# Patient Record
Sex: MALE | Race: AMERICAN INDIAN OR ALASKA NATIVE
[De-identification: names, ages, dates, MRNs, and addresses within clinical notes are randomized per-mention and may not be internally consistent; named-entity substitution may affect disease eponyms.]

---

## 2018-09-04 ENCOUNTER — HOSPITAL ENCOUNTER (EMERGENCY)
Dept: HOSPITAL 5 - ED | Age: 48
Discharge: HOME | End: 2018-09-04
Payer: SELF-PAY

## 2018-09-04 VITALS — SYSTOLIC BLOOD PRESSURE: 141 MMHG | DIASTOLIC BLOOD PRESSURE: 80 MMHG

## 2018-09-04 DIAGNOSIS — M10.9: Primary | ICD-10-CM

## 2018-09-04 PROCEDURE — 99282 EMERGENCY DEPT VISIT SF MDM: CPT

## 2018-09-04 PROCEDURE — 96372 THER/PROPH/DIAG INJ SC/IM: CPT

## 2018-09-04 NOTE — EMERGENCY DEPARTMENT REPORT
ED Extremity Problem HPI





- General


Chief complaint: Extremity Injury, Lower


Stated complaint: FOOT SWELLING/PAIN


Time Seen by Provider: 18 11:20


Source: patient


Mode of arrival: Ambulatory


Limitations: No Limitations





- History of Present Illness


Initial comments: 





This is a 48-year-old male here report that he has a cut flareup to his left 

foot is having pain and swelling for the last 2 days.  Patient said he has a 

history of same.  He is eating over the last 3 days red beans, pizza, beer and 

some seafood.  Pain is 9 out of 10 in a can, throbbing.  Reports some redness 

to the his great toe.  Pain is constant.  No alleviating factors.


MD Complaint: extremity pain, extremity swelling


Onset/Timin


-: days(s)


Location: left, lower extremity (left foot)


History of Same: Yes


-: No myalgia, Yes arthralgia, No fever, No associated dyspnea, No associated 

chest pain


Severity scale (0 -10): 9


Quality: aching, constant, other (throbbing)


Consistency: constant


Improves with: nothing


Worsens with: weight bearing, walking, exertion, palpation


Associated Symptoms: arthralgias.  denies: chest pain, shortness of breath, 

fever, myalgias, rash





- Related Data


 Previous Rx's











 Medication  Instructions  Recorded  Last Taken  Type


 


Acetaminophen/Codeine [Tylenol 1 tab PO Q6H PRN #14 tab 18 Unknown Rx





/Codeine # 3 tab]    


 


Colchicine 0.6 mg PO BID PRN #12 tablet 18 Unknown Rx


 


Ibuprofen [Motrin] 800 mg PO Q8HR PRN #15 tablet 18 Unknown Rx


 


methylPREDNISolone [Medrol Dose 4 mg PO DAILY #1 tab.ds.pk 18 Unknown Rx





Lei]    











 Allergies











Allergy/AdvReac Type Severity Reaction Status Date / Time


 


No Known Allergies Allergy   Unverified 18 09:45














ED Review of Systems


ROS: 


Stated complaint: FOOT SWELLING/PAIN


Other details as noted in HPI





Comment: All other systems reviewed and negative


Constitutional: denies: chills, fever


ENT: denies: ear pain, throat pain, epistaxis, congestion


Respiratory: denies: cough, shortness of breath, SOB with exertion, SOB at rest

, stridor, wheezing


Cardiovascular: denies: chest pain, palpitations, dyspnea on exertion, edema, 

syncope, paroxysmal nocturnal dyspnea


Gastrointestinal: denies: abdominal pain, nausea, vomiting


Genitourinary: denies: hematuria


Musculoskeletal: joint swelling, arthralgia.  denies: back pain, myalgia


Skin: denies: rash


Neurological: denies: headache, weakness, numbness, paresthesias, confusion, 

abnormal gait, vertigo





ED Past Medical Hx





- Past Medical History


Previous Medical History?: Yes


Additional medical history: gout





- Surgical History


Past Surgical History?: No





- Family History


Family history: hypertension





- Social History


Smoking Status: Never Smoker


Substance Use Type: None





- Medications


Home Medications: 


 Home Medications











 Medication  Instructions  Recorded  Confirmed  Last Taken  Type


 


Acetaminophen/Codeine [Tylenol 1 tab PO Q6H PRN #14 tab 18  Unknown Rx





/Codeine # 3 tab]     


 


Colchicine 0.6 mg PO BID PRN #12 tablet 18  Unknown Rx


 


Ibuprofen [Motrin] 800 mg PO Q8HR PRN #15 tablet 18  Unknown Rx


 


methylPREDNISolone [Medrol Dose 4 mg PO DAILY #1 tab.ds.pk 18  Unknown Rx





Lei]     














ED Physical Exam





- General


Limitations: No Limitations


General appearance: alert, in no apparent distress





- Head


Head exam: Present: atraumatic, normocephalic, normal inspection





- Expanded Head Exam


  ** Expanded


Head exam: Absent: laceration, abrasion, contusion, hematoma, racoon eyes, 

levine's sign, general tenderness, tenderness of temporal artery, CSF rhinorrhea

, CSF otorrhea





- Eye


Eye exam: Present: normal appearance, PERRL, EOMI.  Absent: nystagmus, 

periorbital swelling, periorbital tenderness


Pupils: Present: normal accommodation





- ENT


ENT exam: Present: normal exam, normal orophraynx, mucous membranes moist, TM's 

normal bilaterally, normal external ear exam





- Neck


Neck exam: Present: normal inspection, full ROM, other (no C-spine tenderness).

  Absent: tenderness, lymphadenopathy





- Respiratory


Respiratory exam: Present: normal lung sounds bilaterally.  Absent: respiratory 

distress, chest wall tenderness





- Cardiovascular


Cardiovascular Exam: Present: regular rate, normal rhythm, normal heart sounds.

  Absent: systolic murmur, diastolic murmur





- GI/Abdominal


GI/Abdominal exam: Present: soft, normal bowel sounds.  Absent: distended, 

tenderness, guarding, rebound, rigid, organomegaly





- Extremities Exam


Extremities exam: Present: normal inspection, full ROM, normal capillary refill

, other.  Absent: tenderness, pedal edema, joint swelling, calf tenderness





- Expanded Lower Extremity Exam


  ** Left


Hip exam: Present: normal inspection, full ROM, pelvic stability.  Absent: 

tenderness, swelling, abrasion, laceration, ecchymosis, deformity, crepidus, 

dislocation, erythema, external rotation, internal rotation, shortening


Upper Leg exam: Present: normal inspection, full ROM.  Absent: tenderness, 

swelling, abrasion, laceration, ecchymosis, deformity, crepidus, dislocation, 

erythema


Knee exam: Present: normal inspection, full ROM, full knee extension.  Absent: 

tenderness, swelling, abrasion, laceration, ecchymosis, deformity, crepidus, 

dislocation, erythema, effusion, pain w/ pronation/supination, posterior draw 

sign, pain/laxity with valgus, pain/laxity with varus


Lower Leg exam: Present: normal inspection, full ROM.  Absent: tenderness, 

swelling, abrasion, laceration, ecchymosis, deformity, crepidus, dislocation, 

erythema, palpable cord, Raza's sign


Ankle exam: Present: normal inspection, full ROM.  Absent: tenderness, swelling

, abrasion, laceration, ecchymosis, deformity, crepidus, dislocation, erythema, 

anterior draw sign


Foot/Toe exam: Present: full ROM (full range of motion but pain with 

dorsiflexion and plantar flexion), tenderness (tender to palpate dorsal aspects 

first defect in the distal metatarsal bone area), swelling, erythema (distal 

metatarsal bone first second and third metatarsal).  Absent: normal inspection, 

abrasion, laceration, ecchymosis, deformity, crepidus, dislocation, amputation, 

puncture wound, foreign body, calcaneal tenderness, tenderness at base of 5th 

metatarsal, nail avulsion, subungual hematoma


Neuro vascular tendon exam: Present: no vascular compromise, significant pain 

with passive ROM of distal joint.  Absent: pulse deficit, abnormal cap refill, 

motor deficit, sensory deficit, tendon deficit, extremity cold to touch, pallor

, abnormal 2-point discrimination, decreased fine/light touch, foot drop, 

peroneal nerve deficit


Gait: Positive: observed and limited by pain





- Back Exam


Back exam: Present: normal inspection, full ROM, other (ambulates without any 

difficulties).  Absent: tenderness, CVA tenderness (R), CVA tenderness (L), 

muscle spasm, paraspinal tenderness, vertebral tenderness, rash noted





- Neurological Exam


Neurological exam: Present: alert, oriented X3, normal gait, reflexes normal.  

Absent: motor sensory deficit





- Psychiatric


Psychiatric exam: Present: normal affect, normal mood





- Skin


Skin exam: Present: warm, dry, intact, normal color.  Absent: rash





ED Course


 Vital Signs











  18





  09:46


 


Temperature 99.1 F


 


Pulse Rate 80


 


Respiratory 18





Rate 


 


Blood Pressure 141/80


 


O2 Sat by Pulse 98





Oximetry 














- Reevaluation(s)


Reevaluation #1: 





18 11:42


Patient received Decadron 10 mg IM and Toradol 60 mg IM in emergency room for 

pain.





ED Medical Decision Making





- Medical Decision Making





This is a 48-year-old male here for gout flareup due eating high purine food 

over the weekend.





This patient was seen and examined by myself and physical exam is normal except 

he has left foot tenderness, erythema and painful with weightbearing to first 

second and third metatarsal bone area distally.  He is able to ambulate without 

any difficulties.  Patient has classic signs of gout flareup due to eat and 

seafood, drinking beer and pizza over the weekend.  I gave the patient Decadron 

10 mg IM and Toradol 60 mg IM in emergency room and upper reevaluation he says 

his pain is better.  I discussed with patient the consequences of eating high 

purine food.  He voiced understanding.  Patient vital signs stable afebrile and 

discharged home in stable condition with prescription for colchicine, Motrin, 

prednisone Dosepak and Tylenol 3.


Critical care attestation.: 


If time is entered above; I have spent that time in minutes in the direct care 

of this critically ill patient, excluding procedure time.








ED Disposition


Clinical Impression: 


Gout attack


Qualifiers:


 Gout site: foot Gout etiology: unspecified cause Laterality: left Qualified 

Code(s): M10.9 - Gout, unspecified





Disposition:  TO HOME OR SELFCARE


Is pt being admited?: No


Does the pt Need Aspirin: No


Condition: Stable


Instructions:  Acute Gouty Arthritis (ED), Low Purine Diet (ED)


Additional Instructions: 


Please follow up with primary care as recommended


Increase  fluid intake


Take medication as prescribed .


please do not drive or operate heavy machinery while taking Tylenol No. 3 as 

this medication causes drowsiness 


 See discharge instruction on low Purine diet





Referrals: 


PRIMARY CARE,MD [Primary Care Provider] - 2-3 Days


Forms:  Work/School Release Form(ED)

## 2022-05-06 ENCOUNTER — HOSPITAL ENCOUNTER (EMERGENCY)
Dept: HOSPITAL 5 - ED | Age: 52
Discharge: HOME | End: 2022-05-06
Payer: COMMERCIAL

## 2022-05-06 VITALS — SYSTOLIC BLOOD PRESSURE: 170 MMHG | DIASTOLIC BLOOD PRESSURE: 86 MMHG
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
## 2022-05-06 DIAGNOSIS — S39.012A: Primary | ICD-10-CM

## 2022-05-06 DIAGNOSIS — M54.42: ICD-10-CM

## 2022-05-06 DIAGNOSIS — Z79.899: ICD-10-CM
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
## 2022-05-06 DIAGNOSIS — M10.9: ICD-10-CM

## 2022-05-06 LAB
ALBUMIN SERPL-MCNC: 4.9 G/DL (ref 3.9–5)
ALT SERPL-CCNC: 40 UNITS/L (ref 7–56)
BASOPHILS # (AUTO): 0.1 K/MM3 (ref 0–0.1)
BASOPHILS NFR BLD AUTO: 0.7 % (ref 0–1.8)
BILIRUB UR QL STRIP: (no result)
BLOOD UR QL VISUAL: (no result)
BUN SERPL-MCNC: 13 MG/DL (ref 9–20)
BUN/CREAT SERPL: 14 %
CALCIUM SERPL-MCNC: 9.6 MG/DL (ref 8.4–10.2)
EOSINOPHIL # BLD AUTO: 0.1 K/MM3 (ref 0–0.4)
EOSINOPHIL NFR BLD AUTO: 1.1 % (ref 0–4.3)
HCT VFR BLD CALC: 40.4 % (ref 35.5–45.6)
HEMOLYSIS INDEX: 5
HGB BLD-MCNC: 13.5 GM/DL (ref 11.8–15.2)
LYMPHOCYTES # BLD AUTO: 3.1 K/MM3 (ref 1.2–5.4)
LYMPHOCYTES NFR BLD AUTO: 37.5 % (ref 13.4–35)
MCHC RBC AUTO-ENTMCNC: 33 % (ref 32–34)
MCV RBC AUTO: 89 FL (ref 84–94)
MONOCYTES # (AUTO): 0.8 K/MM3 (ref 0–0.8)
MONOCYTES % (AUTO): 10.3 % (ref 0–7.3)
MUCOUS THREADS #/AREA URNS HPF: (no result) /HPF
PH UR STRIP: 5 [PH] (ref 5–7)
PLATELET # BLD: 355 K/MM3 (ref 140–440)
PROT UR STRIP-MCNC: (no result) MG/DL
RBC # BLD AUTO: 4.55 M/MM3 (ref 3.65–5.03)
RBC #/AREA URNS HPF: 1 /HPF (ref 0–6)
UROBILINOGEN UR-MCNC: < 2 MG/DL (ref ?–2)
WBC #/AREA URNS HPF: 6 /HPF (ref 0–6)

## 2022-05-06 PROCEDURE — 99283 EMERGENCY DEPT VISIT LOW MDM: CPT

## 2022-05-06 PROCEDURE — 36415 COLL VENOUS BLD VENIPUNCTURE: CPT

## 2022-05-06 PROCEDURE — 80053 COMPREHEN METABOLIC PANEL: CPT

## 2022-05-06 PROCEDURE — 85025 COMPLETE CBC W/AUTO DIFF WBC: CPT

## 2022-05-06 PROCEDURE — 96372 THER/PROPH/DIAG INJ SC/IM: CPT

## 2022-05-06 PROCEDURE — 81001 URINALYSIS AUTO W/SCOPE: CPT

## 2022-05-06 NOTE — EMERGENCY DEPARTMENT REPORT
ED Back Pain/Injury HPI





- General


Chief Complaint: Back Pain/Injury


Stated Complaint: BACK PAIN ON LT SIDE


Source: patient


Limitations: No Limitations





- History of Present Illness


Initial Comments: 





Patient is a 52-year-old -American male with past medical history of gout

who presents to the ED with complaint of acute onset persistent nontraumatic low

back pain for the last 1 week.  Patient states that he has been taking 

over-the-counter medications with no relief.  Patient states that the pain is 

especially worse with movement or when he lays down.  Patient denies dysuria, 

urinary frequency and urgency, hematuria, traumatic injury, heavy lifting, fall,

numbness and tingling or weakness of lower extremities bilaterally, fever, 

chills, abdominal pain, nausea and vomiting or diarrhea.


MD Complaint: back pain (Low back pain)


-: Sudden, week(s) (1)


Similar Symptoms Previously: No


Place: home


Radiation: none


Severity: severe


Severity scale (0 -10): 7


Quality: sharp, aching


Consistency: constant


Improves With: none


Worsens With: movement, walking


Context: unknown


Associated Symptoms: denies other symptoms.  denies: confusion, weakness, chest 

pain, numbness, difficulty walking, difficulty urinating, diaphoresis, 

incontinence, fever/chills, constipation, headaches, abdominal pain, loss of 

appetite, malaise, nausea/vomiting, rash, seizure, shortness of breath, syncope





- Related Data


                                  Previous Rx's











 Medication  Instructions  Recorded  Last Taken  Type


 


Acetaminophen/Codeine [Tylenol 1 tab PO Q6H PRN #14 tab 09/04/18 Unknown Rx





/Codeine # 3 tab]    


 


Colchicine 0.6 mg PO BID PRN #12 tablet 09/04/18 Unknown Rx


 


Ibuprofen [Motrin] 800 mg PO Q8HR PRN #15 tablet 09/04/18 Unknown Rx


 


methylPREDNISolone [Medrol Dose 4 mg PO DAILY #1 tab.ds.pk 09/04/18 Unknown Rx





Lei]    


 


Ibuprofen [Motrin] 800 mg PO Q8HR PRN #30 tablet 05/06/22 Unknown Rx


 


methOCARBAMOL [Robaxin TAB] 750 mg PO Q8H PRN #30 tab 05/06/22 Unknown Rx











                                    Allergies











Allergy/AdvReac Type Severity Reaction Status Date / Time


 


No Known Allergies Allergy   Unverified 09/04/18 09:45














ED Review of Systems


ROS: 


Stated complaint: BACK PAIN ON LT SIDE


Other details as noted in HPI





Constitutional: denies: chills, fever


Eyes: denies: eye pain, eye discharge, vision change


ENT: denies: ear pain, throat pain


Respiratory: denies: cough, shortness of breath, wheezing


Cardiovascular: denies: chest pain, palpitations


Endocrine: no symptoms reported


Gastrointestinal: denies: abdominal pain, nausea, diarrhea


Genitourinary: denies: urgency, dysuria


Musculoskeletal: back pain (Low back pain).  denies: joint swelling, arthralgia


Skin: denies: rash, lesions


Neurological: denies: headache, weakness, paresthesias


Psychiatric: denies: anxiety, depression


Hematological/Lymphatic: denies: easy bleeding, easy bruising





ED Past Medical Hx





- Past Medical History


Additional medical history: gout





- Social History


Smoking Status: Never Smoker


Substance Use Type: None





- Medications


Home Medications: 


                                Home Medications











 Medication  Instructions  Recorded  Confirmed  Last Taken  Type


 


Acetaminophen/Codeine [Tylenol 1 tab PO Q6H PRN #14 tab 09/04/18  Unknown Rx





/Codeine # 3 tab]     


 


Colchicine 0.6 mg PO BID PRN #12 tablet 09/04/18  Unknown Rx


 


Ibuprofen [Motrin] 800 mg PO Q8HR PRN #15 tablet 09/04/18  Unknown Rx


 


methylPREDNISolone [Medrol Dose 4 mg PO DAILY #1 tab.ds.pk 09/04/18  Unknown Rx





Lei]     


 


Ibuprofen [Motrin] 800 mg PO Q8HR PRN #30 tablet 05/06/22  Unknown Rx


 


methOCARBAMOL [Robaxin TAB] 750 mg PO Q8H PRN #30 tab 05/06/22  Unknown Rx














ED Physical Exam





- General


Limitations: No Limitations


General appearance: alert, in no apparent distress





- Head


Head exam: Present: atraumatic, normocephalic, normal inspection





- Eye


Eye exam: Present: normal appearance, PERRL, EOMI


Pupils: Present: normal accommodation





- ENT


ENT exam: Present: normal exam, normal orophraynx, mucous membranes moist, TM's 

normal bilaterally, normal external ear exam





- Neck


Neck exam: Present: normal inspection, full ROM.  Absent: tenderness





- Respiratory


Respiratory exam: Present: normal lung sounds bilaterally.  Absent: respiratory 

distress, wheezes, rales, rhonchi, chest wall tenderness, accessory muscle use, 

decreased breath sounds, prolonged expiratory





- Cardiovascular


Cardiovascular Exam: Present: regular rate, normal rhythm, normal heart sounds. 

Absent: systolic murmur, diastolic murmur, rubs, gallop





- GI/Abdominal


GI/Abdominal exam: Present: soft, normal bowel sounds.  Absent: tenderness, 

guarding, rebound, hyperactive bowel sounds, hypoactive bowel sounds, 

organomegaly





- Extremities Exam


Extremities exam: Present: normal inspection, full ROM, normal capillary refill.

 Absent: tenderness, pedal edema, joint swelling, calf tenderness





- Back Exam


Back exam: Present: normal inspection, full ROM, tenderness (Palpable 

lumbosacral paraspinal musculoskeletal tenderness), muscle spasm, paraspinal 

tenderness.  Absent: CVA tenderness (R), CVA tenderness (L), vertebral 

tenderness





- Neurological Exam


Neurological exam: Present: alert, oriented X3, CN II-XII intact, normal gait, 

reflexes normal





- Psychiatric


Psychiatric exam: Present: normal affect, normal mood





- Skin


Skin exam: Present: warm, dry, intact, normal color.  Absent: rash





ED Course





                                   Vital Signs











  05/06/22





  03:32


 


Temperature 98.6 F


 


Pulse Rate 92 H


 


Respiratory 18





Rate 


 


Blood Pressure 174/87


 


O2 Sat by Pulse 96





Oximetry 














ED Medical Decision Making





- Lab Data


Result diagrams: 


                                 05/06/22 03:44





                                 05/06/22 03:44





- Medical Decision Making





This is a 52-year-old -American male with past medical history of gout 

who presents to the ED with complaint of acute onset persistent nontraumatic low

back pain for the last 1 week.  Patient states that he has been taking 

over-the-counter medications with no relief.  Patient states that the pain is 

especially worse with movement or when he lays down.  In the ED, patient is 

alert and oriented x3 and is not in any distress.  Patient is hemodynamically 

stable.  Lab test results were reviewed and are all nonactionable.  Patient was 

treated for pain in the ED and observed.  On reevaluation, patient's pain is 

well controlled medication.  Patient symptoms are likely musculoskeletal.  

Patient was therefore discharged home on medications for pain and advised to 

follow-up with his primary care physician in 7 to 10 days for reevaluation or 

return to the ED immediately if symptoms get worse.





- Differential Diagnosis


Muscle spasm; muscle strain;


Critical care attestation.: 


If time is entered above; I have spent that time in minutes in the direct care 

of this critically ill patient, excluding procedure time.








ED Disposition


Clinical Impression: 


 Spasm of muscle of lower back, Strain of muscle, fascia and tendon of lower 

back, initial encounter





Acute low back pain without sciatica


Qualifiers:


 Back pain laterality: unspecified Qualified Code(s): M54.50 - Low back pain, 

unspecified





Disposition: 01 HOME / SELF CARE / HOMELESS


Is pt being admited?: No


Does the pt Need Aspirin: No


Condition: Stable


Instructions:  Muscle Cramps and Spasms, Easy-to-Read, Muscle Strain, 

Easy-to-Read, Low Back Sprain or Strain Rehab-SportsMed


Additional Instructions: 


Lab test results were reviewed and are all nonactionable.  Therefore your 

symptoms are likely musculoskeletal.  So take pain medications as needed with 

food, drink plenty of fluids and follow-up with your primary care physician in 7

to 10 days for reevaluation.  Return to the ED immediately if symptoms get 

worse.


Prescriptions: 


Ibuprofen [Motrin] 800 mg PO Q8HR PRN #30 tablet


 PRN Reason: Pain , Severe (7-10)


methOCARBAMOL [Robaxin TAB] 750 mg PO Q8H PRN #30 tab


 PRN Reason: Muscle Spasm


Referrals: 


[facility] [Provider Group] - 7-10 days


Forms:  Work/School Release Form(ED)


Time of Disposition: 06:04


Print Language: ENGLISH